# Patient Record
Sex: MALE | Race: WHITE | NOT HISPANIC OR LATINO | Employment: OTHER | URBAN - METROPOLITAN AREA
[De-identification: names, ages, dates, MRNs, and addresses within clinical notes are randomized per-mention and may not be internally consistent; named-entity substitution may affect disease eponyms.]

---

## 2017-07-19 ENCOUNTER — APPOINTMENT (OUTPATIENT)
Dept: GENERAL RADIOLOGY | Facility: HOSPITAL | Age: 72
End: 2017-07-19

## 2017-07-19 ENCOUNTER — HOSPITAL ENCOUNTER (INPATIENT)
Facility: HOSPITAL | Age: 72
LOS: 3 days | Discharge: HOME | End: 2017-07-22
Attending: EMERGENCY MEDICINE | Admitting: HOSPITALIST

## 2017-07-19 DIAGNOSIS — A41.9 SEPSIS, DUE TO UNSPECIFIED ORGANISM: Primary | ICD-10-CM

## 2017-07-19 DIAGNOSIS — J18.9 PNEUMONIA, COMMUNITY ACQUIRED: ICD-10-CM

## 2017-07-19 PROBLEM — E87.6 HYPOKALEMIA: Status: ACTIVE | Noted: 2017-07-19

## 2017-07-19 PROBLEM — E87.1 HYPONATREMIA: Status: ACTIVE | Noted: 2017-07-19

## 2017-07-19 PROBLEM — D72.829 LEUKOCYTOSIS: Status: ACTIVE | Noted: 2017-07-19

## 2017-07-19 LAB
ALBUMIN SERPL-MCNC: 3.9 G/DL (ref 3.2–4.8)
ALBUMIN/GLOB SERPL: 1.3 G/DL (ref 1.5–2.5)
ALP SERPL-CCNC: 54 U/L (ref 25–100)
ALT SERPL W P-5'-P-CCNC: 28 U/L (ref 7–40)
ANION GAP SERPL CALCULATED.3IONS-SCNC: 6 MMOL/L (ref 3–11)
ANION GAP SERPL CALCULATED.3IONS-SCNC: 8 MMOL/L (ref 3–11)
AST SERPL-CCNC: 47 U/L (ref 0–33)
BASOPHILS # BLD AUTO: 0.01 10*3/MM3 (ref 0–0.2)
BASOPHILS NFR BLD AUTO: 0.1 % (ref 0–1)
BILIRUB SERPL-MCNC: 0.4 MG/DL (ref 0.3–1.2)
BUN BLD-MCNC: 13 MG/DL (ref 9–23)
BUN BLD-MCNC: 15 MG/DL (ref 9–23)
BUN/CREAT SERPL: 16.3 (ref 7–25)
BUN/CREAT SERPL: 16.7 (ref 7–25)
CALCIUM SPEC-SCNC: 8.9 MG/DL (ref 8.7–10.4)
CALCIUM SPEC-SCNC: 9.4 MG/DL (ref 8.7–10.4)
CHLORIDE SERPL-SCNC: 83 MMOL/L (ref 99–109)
CHLORIDE SERPL-SCNC: 85 MMOL/L (ref 99–109)
CO2 SERPL-SCNC: 31 MMOL/L (ref 20–31)
CO2 SERPL-SCNC: 32 MMOL/L (ref 20–31)
CREAT BLD-MCNC: 0.8 MG/DL (ref 0.6–1.3)
CREAT BLD-MCNC: 0.9 MG/DL (ref 0.6–1.3)
D-LACTATE SERPL-SCNC: 1.1 MMOL/L (ref 0.5–2)
DEPRECATED RDW RBC AUTO: 44.4 FL (ref 37–54)
EOSINOPHIL # BLD AUTO: 0 10*3/MM3 (ref 0–0.3)
EOSINOPHIL NFR BLD AUTO: 0 % (ref 0–3)
ERYTHROCYTE [DISTWIDTH] IN BLOOD BY AUTOMATED COUNT: 13.6 % (ref 11.3–14.5)
GFR SERPL CREATININE-BSD FRML MDRD: 84 ML/MIN/1.73
GFR SERPL CREATININE-BSD FRML MDRD: 96 ML/MIN/1.73
GLOBULIN UR ELPH-MCNC: 2.9 GM/DL
GLUCOSE BLD-MCNC: 108 MG/DL (ref 70–100)
GLUCOSE BLD-MCNC: 119 MG/DL (ref 70–100)
GLUCOSE BLDC GLUCOMTR-MCNC: 117 MG/DL (ref 70–130)
GLUCOSE BLDC GLUCOMTR-MCNC: 117 MG/DL (ref 70–130)
HCT VFR BLD AUTO: 37 % (ref 38.9–50.9)
HGB BLD-MCNC: 12.6 G/DL (ref 13.1–17.5)
HOLD SPECIMEN: NORMAL
HOLD SPECIMEN: NORMAL
IMM GRANULOCYTES # BLD: 0.04 10*3/MM3 (ref 0–0.03)
IMM GRANULOCYTES NFR BLD: 0.3 % (ref 0–0.6)
LYMPHOCYTES # BLD AUTO: 0.84 10*3/MM3 (ref 0.6–4.8)
LYMPHOCYTES NFR BLD AUTO: 6.7 % (ref 24–44)
MAGNESIUM SERPL-MCNC: 1.9 MG/DL (ref 1.3–2.7)
MCH RBC QN AUTO: 29.9 PG (ref 27–31)
MCHC RBC AUTO-ENTMCNC: 34.1 G/DL (ref 32–36)
MCV RBC AUTO: 87.9 FL (ref 80–99)
MONOCYTES # BLD AUTO: 1.69 10*3/MM3 (ref 0–1)
MONOCYTES NFR BLD AUTO: 13.5 % (ref 0–12)
NEUTROPHILS # BLD AUTO: 9.91 10*3/MM3 (ref 1.5–8.3)
NEUTROPHILS NFR BLD AUTO: 79.4 % (ref 41–71)
PLATELET # BLD AUTO: 291 10*3/MM3 (ref 150–450)
PMV BLD AUTO: 9.6 FL (ref 6–12)
POTASSIUM BLD-SCNC: 2.9 MMOL/L (ref 3.5–5.5)
POTASSIUM BLD-SCNC: 3 MMOL/L (ref 3.5–5.5)
PROT SERPL-MCNC: 6.8 G/DL (ref 5.7–8.2)
RBC # BLD AUTO: 4.21 10*6/MM3 (ref 4.2–5.76)
SODIUM BLD-SCNC: 122 MMOL/L (ref 132–146)
SODIUM BLD-SCNC: 123 MMOL/L (ref 132–146)
TROPONIN I SERPL-MCNC: 0.02 NG/ML (ref 0–0.07)
WBC NRBC COR # BLD: 12.49 10*3/MM3 (ref 3.5–10.8)
WHOLE BLOOD HOLD SPECIMEN: NORMAL
WHOLE BLOOD HOLD SPECIMEN: NORMAL

## 2017-07-19 PROCEDURE — 83735 ASSAY OF MAGNESIUM: CPT | Performed by: EMERGENCY MEDICINE

## 2017-07-19 PROCEDURE — 25010000002 AZITHROMYCIN: Performed by: NURSE PRACTITIONER

## 2017-07-19 PROCEDURE — 99284 EMERGENCY DEPT VISIT MOD MDM: CPT

## 2017-07-19 PROCEDURE — 71020 HC CHEST PA AND LATERAL: CPT

## 2017-07-19 PROCEDURE — 25010000002 PIPERACILLIN-TAZOBACTAM: Performed by: EMERGENCY MEDICINE

## 2017-07-19 PROCEDURE — 85025 COMPLETE CBC W/AUTO DIFF WBC: CPT | Performed by: EMERGENCY MEDICINE

## 2017-07-19 PROCEDURE — 87040 BLOOD CULTURE FOR BACTERIA: CPT | Performed by: EMERGENCY MEDICINE

## 2017-07-19 PROCEDURE — 84484 ASSAY OF TROPONIN QUANT: CPT

## 2017-07-19 PROCEDURE — 25010000002 CEFTRIAXONE PER 250 MG: Performed by: NURSE PRACTITIONER

## 2017-07-19 PROCEDURE — 83605 ASSAY OF LACTIC ACID: CPT | Performed by: EMERGENCY MEDICINE

## 2017-07-19 PROCEDURE — 25010000002 ENOXAPARIN PER 10 MG: Performed by: NURSE PRACTITIONER

## 2017-07-19 PROCEDURE — 99223 1ST HOSP IP/OBS HIGH 75: CPT | Performed by: HOSPITALIST

## 2017-07-19 PROCEDURE — 80053 COMPREHEN METABOLIC PANEL: CPT | Performed by: EMERGENCY MEDICINE

## 2017-07-19 PROCEDURE — 25010000002 VANCOMYCIN: Performed by: EMERGENCY MEDICINE

## 2017-07-19 PROCEDURE — 93005 ELECTROCARDIOGRAM TRACING: CPT | Performed by: EMERGENCY MEDICINE

## 2017-07-19 PROCEDURE — 82962 GLUCOSE BLOOD TEST: CPT

## 2017-07-19 RX ORDER — SODIUM CHLORIDE 0.9 % (FLUSH) 0.9 %
10 SYRINGE (ML) INJECTION AS NEEDED
Status: DISCONTINUED | OUTPATIENT
Start: 2017-07-19 | End: 2017-07-22 | Stop reason: HOSPADM

## 2017-07-19 RX ORDER — SODIUM CHLORIDE 0.9 % (FLUSH) 0.9 %
1-10 SYRINGE (ML) INJECTION AS NEEDED
Status: DISCONTINUED | OUTPATIENT
Start: 2017-07-19 | End: 2017-07-22 | Stop reason: HOSPADM

## 2017-07-19 RX ORDER — POTASSIUM CHLORIDE 1.5 G/1.77G
40 POWDER, FOR SOLUTION ORAL AS NEEDED
Status: DISCONTINUED | OUTPATIENT
Start: 2017-07-19 | End: 2017-07-22 | Stop reason: HOSPADM

## 2017-07-19 RX ORDER — SODIUM CHLORIDE 0.9 % (FLUSH) 0.9 %
10 SYRINGE (ML) INJECTION AS NEEDED
Status: DISCONTINUED | OUTPATIENT
Start: 2017-07-19 | End: 2017-07-19

## 2017-07-19 RX ORDER — POTASSIUM CHLORIDE 7.45 MG/ML
10 INJECTION INTRAVENOUS
Status: DISCONTINUED | OUTPATIENT
Start: 2017-07-19 | End: 2017-07-22 | Stop reason: HOSPADM

## 2017-07-19 RX ORDER — CALCIUM CARBONATE 200(500)MG
2 TABLET,CHEWABLE ORAL 2 TIMES DAILY PRN
Status: DISCONTINUED | OUTPATIENT
Start: 2017-07-19 | End: 2017-07-22 | Stop reason: HOSPADM

## 2017-07-19 RX ORDER — ALUMINA, MAGNESIA, AND SIMETHICONE 2400; 2400; 240 MG/30ML; MG/30ML; MG/30ML
15 SUSPENSION ORAL EVERY 6 HOURS PRN
Status: DISCONTINUED | OUTPATIENT
Start: 2017-07-19 | End: 2017-07-22 | Stop reason: HOSPADM

## 2017-07-19 RX ORDER — CEFTRIAXONE SODIUM 1 G/50ML
1 INJECTION, SOLUTION INTRAVENOUS
Status: DISCONTINUED | OUTPATIENT
Start: 2017-07-19 | End: 2017-07-22 | Stop reason: HOSPADM

## 2017-07-19 RX ORDER — POTASSIUM CHLORIDE 750 MG/1
40 CAPSULE, EXTENDED RELEASE ORAL AS NEEDED
Status: DISCONTINUED | OUTPATIENT
Start: 2017-07-19 | End: 2017-07-22 | Stop reason: HOSPADM

## 2017-07-19 RX ORDER — ONDANSETRON 2 MG/ML
4 INJECTION INTRAMUSCULAR; INTRAVENOUS EVERY 6 HOURS PRN
Status: DISCONTINUED | OUTPATIENT
Start: 2017-07-19 | End: 2017-07-22 | Stop reason: HOSPADM

## 2017-07-19 RX ORDER — ONDANSETRON 4 MG/1
4 TABLET, FILM COATED ORAL EVERY 6 HOURS PRN
Status: DISCONTINUED | OUTPATIENT
Start: 2017-07-19 | End: 2017-07-22 | Stop reason: HOSPADM

## 2017-07-19 RX ORDER — SENNA AND DOCUSATE SODIUM 50; 8.6 MG/1; MG/1
2 TABLET, FILM COATED ORAL 2 TIMES DAILY
Status: DISCONTINUED | OUTPATIENT
Start: 2017-07-19 | End: 2017-07-22 | Stop reason: HOSPADM

## 2017-07-19 RX ORDER — SODIUM CHLORIDE 9 MG/ML
75 INJECTION, SOLUTION INTRAVENOUS CONTINUOUS
Status: DISCONTINUED | OUTPATIENT
Start: 2017-07-19 | End: 2017-07-22 | Stop reason: HOSPADM

## 2017-07-19 RX ORDER — ACETAMINOPHEN 500 MG
1000 TABLET ORAL ONCE
Status: COMPLETED | OUTPATIENT
Start: 2017-07-19 | End: 2017-07-19

## 2017-07-19 RX ORDER — ACETAMINOPHEN 325 MG/1
650 TABLET ORAL EVERY 4 HOURS PRN
Status: DISCONTINUED | OUTPATIENT
Start: 2017-07-19 | End: 2017-07-22 | Stop reason: HOSPADM

## 2017-07-19 RX ADMIN — POTASSIUM CHLORIDE 40 MEQ: 750 CAPSULE, EXTENDED RELEASE ORAL at 19:52

## 2017-07-19 RX ADMIN — CALCIUM CARBONATE 2 TABLET: 500 TABLET ORAL at 21:48

## 2017-07-19 RX ADMIN — ENOXAPARIN SODIUM 40 MG: 40 INJECTION SUBCUTANEOUS at 19:52

## 2017-07-19 RX ADMIN — VANCOMYCIN HYDROCHLORIDE 1500 MG: 1 INJECTION, POWDER, LYOPHILIZED, FOR SOLUTION INTRAVENOUS at 14:10

## 2017-07-19 RX ADMIN — AZITHROMYCIN 500 MG: 500 INJECTION, POWDER, LYOPHILIZED, FOR SOLUTION INTRAVENOUS at 19:56

## 2017-07-19 RX ADMIN — SODIUM CHLORIDE 1000 ML: 9 INJECTION, SOLUTION INTRAVENOUS at 14:10

## 2017-07-19 RX ADMIN — TAZOBACTAM SODIUM AND PIPERACILLIN SODIUM 4.5 G: .5; 4 INJECTION, POWDER, LYOPHILIZED, FOR SOLUTION INTRAVENOUS at 15:46

## 2017-07-19 RX ADMIN — SODIUM CHLORIDE 75 ML/HR: 9 INJECTION, SOLUTION INTRAVENOUS at 22:05

## 2017-07-19 RX ADMIN — ACETAMINOPHEN 1000 MG: 500 TABLET ORAL at 14:10

## 2017-07-19 RX ADMIN — POTASSIUM CHLORIDE 40 MEQ: 750 CAPSULE, EXTENDED RELEASE ORAL at 23:30

## 2017-07-19 RX ADMIN — CEFTRIAXONE SODIUM 1 G: 1 INJECTION, SOLUTION INTRAVENOUS at 19:52

## 2017-07-19 NOTE — PLAN OF CARE
Problem: Patient Care Overview (Adult)  Goal: Adult Individualization and Mutuality  Outcome: Ongoing (interventions implemented as appropriate)    07/19/17 1917   Individualization   Patient Specific Goals Pain<4   Patient Specific Interventions Assess pain q2h & prn         Problem: Pneumonia (Adult)  Goal: Signs and Symptoms of Listed Potential Problems Will be Absent or Manageable (Pneumonia)  Outcome: Ongoing (interventions implemented as appropriate)    07/19/17 1917   Pneumonia   Problems Assessed (Pneumonia) progression of infection   Problems Present (Pneumonia) progression of infection

## 2017-07-19 NOTE — ED PROVIDER NOTES
Subjective   HPI Comments: Glen Gonzalez is a 71 y.o. male who presents to the ED with AMS. For the past 2 weeks he has had a productive cough, of clear, white, and green sputum, but for the past week has felt disoriented and lethargic. His wife first noticed 2 nights ago that he seemed disoriented and reports that he woke up several times at night, acting abnormally. He denies any fever, CP, burning or itching of his eyes, swelling in his legs, abdominal pain, nausea, vomiting, or urinary symptoms, though he has had decreased appetite, chills, and diarrhea the past couple nights. Prior to 2 days ago, his wife reports that he had been acting normally to her. He was seen at his PCP's office, who recommended presentation to the ED. He has no known medical history and reports nothing else acute at this time.     Patient is a 71 y.o. male presenting with altered mental status.   History provided by:  Patient and spouse  Altered Mental Status   Presenting symptoms: disorientation and lethargy    Severity:  Moderate  Most recent episode:  Today  Episode history:  Multiple  Timing:  Constant  Progression:  Worsening  Chronicity:  New  Associated symptoms: decreased appetite    Associated symptoms: no abdominal pain, no fever, no nausea and no vomiting        Review of Systems   Constitutional: Positive for appetite change, chills and decreased appetite. Negative for fever.   Eyes: Negative for pain and itching.   Respiratory: Positive for cough (Productive). Negative for shortness of breath.    Cardiovascular: Negative for chest pain and leg swelling.   Gastrointestinal: Positive for diarrhea. Negative for abdominal pain, nausea and vomiting.   Genitourinary: Negative for frequency and urgency.   All other systems reviewed and are negative.      History reviewed. No pertinent past medical history.    No Known Allergies    History reviewed. No pertinent surgical history.    History reviewed. No pertinent family  history.    Social History     Social History   • Marital status:      Spouse name: N/A   • Number of children: N/A   • Years of education: N/A     Social History Main Topics   • Smoking status: Never Smoker   • Smokeless tobacco: None   • Alcohol use 4.8 oz/week     8 Cans of beer per week   • Drug use: No   • Sexual activity: Not Asked     Other Topics Concern   • None     Social History Narrative   • None         Objective   Physical Exam   Constitutional: He is oriented to person, place, and time. He appears well-developed and well-nourished. No distress.   HENT:   Head: Normocephalic and atraumatic.   Nose: Nose normal.   Mouth/Throat: Oropharynx is clear and moist and mucous membranes are normal.   Eyes: Conjunctivae are normal. No scleral icterus.   Neck: Normal range of motion. Neck supple.   Cardiovascular: Normal rate, regular rhythm, normal heart sounds and intact distal pulses.    No murmur heard.  Pulmonary/Chest: Effort normal and breath sounds normal. No respiratory distress.   Abdominal: Soft. Bowel sounds are normal. There is no tenderness.   Musculoskeletal: Normal range of motion.   Neurological: He is alert and oriented to person, place, and time.   Skin: Skin is warm and dry. He is not diaphoretic.   Psychiatric: He has a normal mood and affect. His behavior is normal.   Nursing note and vitals reviewed.      Critical Care  Performed by: DAT LOPEZ  Authorized by: DAT LOPEZ   Total critical care time: 40 minutes  Critical care time was exclusive of teaching time and separately billable procedures and treating other patients.  Critical care was necessary to treat or prevent imminent or life-threatening deterioration of the following conditions: respiratory failure and sepsis.  Critical care was time spent personally by me on the following activities: development of treatment plan with patient or surrogate, evaluation of patient's response to treatment, examination of patient,  obtaining history from patient or surrogate, ordering and performing treatments and interventions, ordering and review of laboratory studies, ordering and review of radiographic studies, re-evaluation of patient's condition and pulse oximetry.  Comments: Patient with altered mental status due to sepsis, rapid initiation of antibiotics, fluid bolus, recheck, treatment of fever.               ED Course  ED Course   Comment By Time   Staying alert and apparently oriented.  No objection to admission.  Short stay anticipated. Pop Robins MD 07/19 8323                 Recent Results (from the past 24 hour(s))   Comprehensive Metabolic Panel    Collection Time: 07/19/17  1:31 PM   Result Value Ref Range    Glucose 108 (H) 70 - 100 mg/dL    BUN 15 9 - 23 mg/dL    Creatinine 0.90 0.60 - 1.30 mg/dL    Sodium 123 (L) 132 - 146 mmol/L    Potassium 2.9 (L) 3.5 - 5.5 mmol/L    Chloride 83 (C) 99 - 109 mmol/L    CO2 32.0 (H) 20.0 - 31.0 mmol/L    Calcium 9.4 8.7 - 10.4 mg/dL    Total Protein 6.8 5.7 - 8.2 g/dL    Albumin 3.90 3.20 - 4.80 g/dL    ALT (SGPT) 28 7 - 40 U/L    AST (SGOT) 47 (H) 0 - 33 U/L    Alkaline Phosphatase 54 25 - 100 U/L    Total Bilirubin 0.4 0.3 - 1.2 mg/dL    eGFR Non African Amer 84 >60 mL/min/1.73    Globulin 2.9 gm/dL    A/G Ratio 1.3 (L) 1.5 - 2.5 g/dL    BUN/Creatinine Ratio 16.7 7.0 - 25.0    Anion Gap 8.0 3.0 - 11.0 mmol/L   Magnesium    Collection Time: 07/19/17  1:31 PM   Result Value Ref Range    Magnesium 1.9 1.3 - 2.7 mg/dL   Light Blue Top    Collection Time: 07/19/17  1:31 PM   Result Value Ref Range    Extra Tube hold for add-on    Green Top (Gel)    Collection Time: 07/19/17  1:31 PM   Result Value Ref Range    Extra Tube Hold for add-ons.    Lavender Top    Collection Time: 07/19/17  1:31 PM   Result Value Ref Range    Extra Tube hold for add-on    Gold Top - SST    Collection Time: 07/19/17  1:31 PM   Result Value Ref Range    Extra Tube Hold for add-ons.    CBC Auto Differential     Collection Time: 07/19/17  1:31 PM   Result Value Ref Range    WBC 12.49 (H) 3.50 - 10.80 10*3/mm3    RBC 4.21 4.20 - 5.76 10*6/mm3    Hemoglobin 12.6 (L) 13.1 - 17.5 g/dL    Hematocrit 37.0 (L) 38.9 - 50.9 %    MCV 87.9 80.0 - 99.0 fL    MCH 29.9 27.0 - 31.0 pg    MCHC 34.1 32.0 - 36.0 g/dL    RDW 13.6 11.3 - 14.5 %    RDW-SD 44.4 37.0 - 54.0 fl    MPV 9.6 6.0 - 12.0 fL    Platelets 291 150 - 450 10*3/mm3    Neutrophil % 79.4 (H) 41.0 - 71.0 %    Lymphocyte % 6.7 (L) 24.0 - 44.0 %    Monocyte % 13.5 (H) 0.0 - 12.0 %    Eosinophil % 0.0 0.0 - 3.0 %    Basophil % 0.1 0.0 - 1.0 %    Immature Grans % 0.3 0.0 - 0.6 %    Neutrophils, Absolute 9.91 (H) 1.50 - 8.30 10*3/mm3    Lymphocytes, Absolute 0.84 0.60 - 4.80 10*3/mm3    Monocytes, Absolute 1.69 (H) 0.00 - 1.00 10*3/mm3    Eosinophils, Absolute 0.00 0.00 - 0.30 10*3/mm3    Basophils, Absolute 0.01 0.00 - 0.20 10*3/mm3    Immature Grans, Absolute 0.04 (H) 0.00 - 0.03 10*3/mm3   Lactic Acid, Plasma    Collection Time: 07/19/17  1:34 PM   Result Value Ref Range    Lactate 1.1 0.5 - 2.0 mmol/L   POC Troponin, Rapid    Collection Time: 07/19/17  1:42 PM   Result Value Ref Range    Troponin I 0.02 0.00 - 0.07 ng/mL   POC Glucose Fingerstick    Collection Time: 07/19/17  1:44 PM   Result Value Ref Range    Glucose 117 70 - 130 mg/dL   POC Glucose Fingerstick    Collection Time: 07/19/17  1:59 PM   Result Value Ref Range    Glucose 117 70 - 130 mg/dL     Note: In addition to lab results from this visit, the labs listed above may include labs taken at another facility or during a different encounter within the last 24 hours. Please correlate lab times with ED admission and discharge times for further clarification of the services performed during this visit.    XR Chest 2 View   Preliminary Result   1. Left lower lobe pneumonia.   2. Mild diffuse interstitial changes elsewhere which could be chronic or   could reflect an underlying bronchitis.    3. Round dense left upper  "lobe nodule, probably partially calcified and   favored to represent a granuloma. If there are no previous outside   studies available for comparison, consider chest CT scan evaluation to   confirm calcification.       D:  07/19/2017   E:  07/19/2017            Vitals:    07/19/17 1255 07/19/17 1413 07/19/17 1430 07/19/17 1500   BP: 149/72 143/73 136/67 120/66   BP Location: Left arm      Patient Position: Sitting      Pulse: 110      Resp: 20      Temp: 99.8 °F (37.7 °C)      TempSrc: Oral      SpO2: 93%  90%    Weight: 152 lb (68.9 kg)      Height: 69\" (175.3 cm)        Medications   piperacillin-tazobactam (ZOSYN) 4.5 g/100 mL 0.9% NS IVPB (mbp) (not administered)   sodium chloride 0.9 % flush 10 mL (not administered)   sodium chloride 0.9 % bolus 1,000 mL (1,000 mL Intravenous New Bag 7/19/17 1410)   vancomycin 1500 mg/500 mL 0.9% NS IVPB ([facility]) (1,500 mg Intravenous New Bag 7/19/17 1410)   acetaminophen (TYLENOL) tablet 1,000 mg (1,000 mg Oral Given 7/19/17 1410)     ECG/EMG Results (last 24 hours)     Procedure Component Value Units Date/Time    ECG 12 Lead [[id number]] Collected:  07/19/17 1317     Updated:  07/19/17 1334    Narrative:       Test Reason : Weak/Dizzy/AMS protocol  Blood Pressure : **/** mmHG  Vent. Rate : 105 BPM     Atrial Rate : 105 BPM     P-R Int : 148 ms          QRS Dur : 136 ms      QT Int : 382 ms       P-R-T Axes : 070 -02 096 degrees     QTc Int : 504 ms    Sinus tachycardia  Biatrial enlargement  Left bundle branch block  Abnormal ECG  No previous ECGs available  Confirmed by DAT LOPEZ MD ([id number]) on 7/19/2017 1:34:18 PM    Referred By:  VIKASH REYES           Confirmed By:DAT LOPEZ MD            [facility]    Final diagnoses:   Sepsis, due to unspecified organism   Pneumonia, community acquired       Documentation assistance provided by casey Gunter.  Information recorded by the scribe was done at my direction and has been verified and validated by me.     Lorie Gunter  07/19/17 " 1410       Pop Robins MD  07/19/17 7906

## 2017-07-19 NOTE — H&P
[facility] Medicine Services  HISTORY AND PHYSICAL    Primary Care Physician: No Known Provider    Subjective     Chief Complaint:  Cough/ disoriented     History of Present Illness:     71 y.o male with no PMH presented to the ED today for cough and disorientation. Wife brought patient ot his PCP today for evaluation and was sent to ED for further workup. Started having productive cough 2 weeks ago with white sputum that has become green in color. Noticed a sore throat and PND for the last week as well. Patient noticed that he was becoming disoriented over the last few days when he was unable to find his way around the house. Associated symptoms include headache, diarrhea and decreased appetite. Denies sick contacts or recent illnesses.   CXR in ED showed LLL CAP. Patient meets sepsis criteria with fever, leukocytosis and transient hypotension. Given fluid bolus and started on IV Zosyn and Vancomycin. Asked to be admitted to hospital medicine for further evaluation.      Review of Systems   Constitutional: Positive for appetite change, chills and fever.   HENT: Negative for congestion, trouble swallowing and voice change.    Eyes: Negative for photophobia and visual disturbance.   Respiratory: Positive for cough. Negative for shortness of breath and wheezing.    Cardiovascular: Negative for chest pain, palpitations and leg swelling.   Gastrointestinal: Positive for diarrhea. Negative for abdominal distention, abdominal pain, constipation, nausea and vomiting.   Endocrine: Negative for cold intolerance and heat intolerance.   Genitourinary: Negative for difficulty urinating, dysuria and flank pain.   Musculoskeletal: Negative for arthralgias, back pain and gait problem.   Skin: Negative for color change, pallor, rash and wound.   Neurological: Positive for headaches. Negative for dizziness, syncope, weakness, light-headedness and numbness.   Hematological: Negative for adenopathy. Does not  "bruise/bleed easily.   Psychiatric/Behavioral: Positive for confusion. Negative for agitation, behavioral problems and sleep disturbance. The patient is not nervous/anxious.       Otherwise complete ROS performed and negative except as mentioned in the HPI.    Past Medical History:   Diagnosis Date   • CAP (community acquired pneumonia) of LLL 7/19/2017       History reviewed. No pertinent surgical history.    Family History   Problem Relation Age of Onset   • Heart disease Mother    • Stroke Mother    • Cancer Father        Social History     Social History   • Marital status:      Spouse name: N/A   • Number of children: N/A   • Years of education: N/A     Occupational History   • Not on file.     Social History Main Topics   • Smoking status: Never Smoker   • Smokeless tobacco: Not on file   • Alcohol use 1.8 oz/week     3 Cans of beer per week   • Drug use: No   • Sexual activity: Defer     Other Topics Concern   • Not on file     Social History Narrative    Retired and lives with wife.        Medications:  No prescriptions prior to admission.       Allergies:  No Known Allergies      Objective     Physical Exam:  Vital Signs: /70 (BP Location: Right arm, Patient Position: Lying)  Pulse 81  Temp 98.6 °F (37 °C) (Oral)   Resp 18  Ht 69\" (175.3 cm)  Wt 152 lb (68.9 kg)  SpO2 94%  BMI 22.45 kg/m2  Physical Exam   Constitutional: He is oriented to person, place, and time. He appears well-developed and well-nourished. No distress.   HENT:   Head: Normocephalic and atraumatic.   Mouth/Throat: Oropharynx is clear and moist.   Eyes: Conjunctivae are normal. Pupils are equal, round, and reactive to light. No scleral icterus.   Neck: Neck supple. No thyromegaly present.   Cardiovascular: Normal rate, regular rhythm and intact distal pulses.  Exam reveals no gallop and no friction rub.    Murmur heard.  Pulmonary/Chest: Effort normal. No respiratory distress. He has no wheezes. He has rales (LLL fine " crackles ). He exhibits no tenderness.   Abdominal: Soft. Bowel sounds are normal. He exhibits no distension and no mass. There is no tenderness. There is no rebound and no guarding. No hernia.   Musculoskeletal: He exhibits no edema or tenderness.   Lymphadenopathy:     He has no cervical adenopathy.   Neurological: He is alert and oriented to person, place, and time. No cranial nerve deficit.   Skin: Skin is warm and dry. No rash noted. He is not diaphoretic. No erythema. No pallor.   Psychiatric: He has a normal mood and affect. His behavior is normal.   Vitals reviewed.      Results Reviewed:    Results from last 7 days  Lab Units 07/19/17  1331   WBC 10*3/mm3 12.49*   HEMOGLOBIN g/dL 12.6*   PLATELETS 10*3/mm3 291       Results from last 7 days  Lab Units 07/19/17  1331   SODIUM mmol/L 123*   POTASSIUM mmol/L 2.9*   CO2 mmol/L 32.0*   CREATININE mg/dL 0.90   GLUCOSE mg/dL 108*   CALCIUM mg/dL 9.4   Results for EWA PAYNE JR. (MRN [id number]) as of 7/19/2017 16:52   Ref. Range 7/19/2017 13:42   Troponin I Latest Ref Range: 0.00 - 0.07 ng/mL 0.02   Results for EWA PAYNE JR. (MRN [id number]) as of 7/19/2017 16:52   Ref. Range 7/19/2017 13:34   Lactate, Venous Latest Ref Range: 0.5 - 2.0 mmol/L 1.1     Results for EWA PAYNE JR. (MRN [id number]) as of 7/19/2017 16:52   Ref. Range 7/19/2017 13:31   Magnesium Latest Ref Range: 1.3 - 2.7 mg/dL 1.9     Study Result      EXAMINATION: XR CHEST 2 VW-       INDICATION: Fever, cough.       COMPARISON: None.      FINDINGS: The heart, mediastinum and pulmonary vasculature appear within  normal limits. There is dense opacity over a roughly 12 x 6 cm area of  the posterior medial left lower lobe which appears to represent airspace  disease and presumably a pneumonia. There is coarse interstitial disease  elsewhere, possibly chronic. No effusion is identified. There are a  couple of granulomatous calcifications and also a dense round 2 cm  nodule of the  posteromedial left upper lobe, a little denser than  adjacent rib and vertebral bodies presumably calcified. No other  pulmonary nodules are identified.          IMPRESSION:  1. Left lower lobe pneumonia.  2. Mild diffuse interstitial changes elsewhere which could be chronic or  could reflect an underlying bronchitis.   3. Round dense left upper lobe nodule, probably partially calcified and  favored to represent a granuloma. If there are no previous outside  studies available for comparison, consider chest CT scan evaluation to  confirm calcification.       I have personally reviewed and interpreted available lab data, radiology studies and ECG obtained at time of admission.     Assessment / Plan     Problem List:   Hospital Problem List     * (Principal)CAP (community acquired pneumonia) of LLL    Sepsis    Leukocytosis    Hypokalemia    Hyponatremia          Assessment:    Plan:  Cont antibiotics, but transition to CAP coverage with rocephin and azithromycin  Regular diet   CBC/ BMP in am   Replace electrolytes   PRN pain/ antipyretics/ antiemetics    DVT prophylaxis: Lovenox   Code Status: FULL  Admission Status: Patient will be admitted to (LEXOBS or LEXINPT)     Charmaine Mayberry, APRN 07/19/17 6:33 PM      Brief Attending Note       I have seen and examined the patient, performing an independent face-to-face diagnostic evaluation with plan of care reviewed and developed with the advanced practice clinician (APC).      Brief Summary Statement/HPI:   70 yo M presents from PCP's office due to cough, weakness, and mild confusion. He has had a cough for at least two weeks now, initially productive of thin and white sputum but has progressively gotten thicker and green in color. Last few days he has become disoriented. In the ER his temp was noted to be 102.5 although this is not documented in the chart (Dr. Robins was present during this temperature finding). He was also tachycardic to 110. Labs showed elevated WBC  count at 12.49, Na 123, and K 2.9. CXR showed a left lower lobe pneumonia. Admitted for further management.      Attending Physical Exam:  Temp:  [98.3 °F (36.8 °C)-99.8 °F (37.7 °C)] 98.3 °F (36.8 °C)  Heart Rate:  [] 85  Resp:  [18-20] 18  BP: ()/(66-75) 120/71  Constitutional: no acute distress, awake, alert  Eyes: PERRLA, sclerae anicteric, no conjunctival injection  Neck: supple, no thyromegaly, trachea midline  Respiratory: diminished LLL with some fine rales but no wheezes appreciated, nonlabored respirations   Cardiovascular: RRR, no murmurs, rubs, or gallops, palpable pedal pulses bilaterally  Gastrointestinal: Positive bowel sounds, soft, nontender, nondistended  Musculoskeletal: No bilateral ankle edema, no clubbing or cyanosis to bilateral lower extremities  Psychiatric: oriented x 3, appropriate affect, cooperative  Neurologic: Strength symmetric in all extremities, Cranial Nerves grossly intact to confrontation       Brief Assessment/Plan:  72 yo M presents with 2 week history of worsening productive cough, weakness, and mild confusion. Found to have sepsis due to a left lower lobe pneumonia.    PLAN:  --Continue CAP coverage with Rocephin and Azithromycin. Follow blood cultures. Check Strep pneumo and Legionella urine antigens.  --Start IVF's due to hyponatremia, repeat BMP tonight and in AM to monitor sodium closely.  --Replace K.  --He is doing fairly well and on room air, so if this trend continues I expect he can be transitioned to oral ATBx within 48 hours and go home.     See above for further detailed assessment and plan developed with [facility] which I have reviewed and/or edited.    I believe this patient meets INPATIENT status due to the need for care which can only be reasonably provided in an hospital setting such as aggressive/expedited ancillary services and/or consultation services and the necessity for IV medications, close physician monitoring and/or the possible need for  procedures.  In such, I feel patient’s risk for adverse outcomes and need for care warrant INPATIENT evaluation and predict the patient’s care encounter to likely last beyond 2 midnights.        Parisa Haque MD  07/19/17  8:29 PM

## 2017-07-20 LAB
ANION GAP SERPL CALCULATED.3IONS-SCNC: 10 MMOL/L (ref 3–11)
BASOPHILS # BLD AUTO: 0.01 10*3/MM3 (ref 0–0.2)
BASOPHILS NFR BLD AUTO: 0.1 % (ref 0–1)
BUN BLD-MCNC: 10 MG/DL (ref 9–23)
BUN/CREAT SERPL: 16.7 (ref 7–25)
CALCIUM SPEC-SCNC: 8.9 MG/DL (ref 8.7–10.4)
CHLORIDE SERPL-SCNC: 90 MMOL/L (ref 99–109)
CO2 SERPL-SCNC: 25 MMOL/L (ref 20–31)
CREAT BLD-MCNC: 0.6 MG/DL (ref 0.6–1.3)
DEPRECATED RDW RBC AUTO: 45.8 FL (ref 37–54)
EOSINOPHIL # BLD AUTO: 0.03 10*3/MM3 (ref 0–0.3)
EOSINOPHIL NFR BLD AUTO: 0.3 % (ref 0–3)
ERYTHROCYTE [DISTWIDTH] IN BLOOD BY AUTOMATED COUNT: 13.9 % (ref 11.3–14.5)
GFR SERPL CREATININE-BSD FRML MDRD: 134 ML/MIN/1.73
GLUCOSE BLD-MCNC: 100 MG/DL (ref 70–100)
HCT VFR BLD AUTO: 35.7 % (ref 38.9–50.9)
HGB BLD-MCNC: 11.9 G/DL (ref 13.1–17.5)
IMM GRANULOCYTES # BLD: 0.06 10*3/MM3 (ref 0–0.03)
IMM GRANULOCYTES NFR BLD: 0.6 % (ref 0–0.6)
LYMPHOCYTES # BLD AUTO: 1.01 10*3/MM3 (ref 0.6–4.8)
LYMPHOCYTES NFR BLD AUTO: 9.9 % (ref 24–44)
MCH RBC QN AUTO: 29.8 PG (ref 27–31)
MCHC RBC AUTO-ENTMCNC: 33.3 G/DL (ref 32–36)
MCV RBC AUTO: 89.3 FL (ref 80–99)
MONOCYTES # BLD AUTO: 1.18 10*3/MM3 (ref 0–1)
MONOCYTES NFR BLD AUTO: 11.5 % (ref 0–12)
NEUTROPHILS # BLD AUTO: 7.94 10*3/MM3 (ref 1.5–8.3)
NEUTROPHILS NFR BLD AUTO: 77.6 % (ref 41–71)
PLATELET # BLD AUTO: 298 10*3/MM3 (ref 150–450)
PMV BLD AUTO: 9.6 FL (ref 6–12)
POTASSIUM BLD-SCNC: 3.9 MMOL/L (ref 3.5–5.5)
RBC # BLD AUTO: 4 10*6/MM3 (ref 4.2–5.76)
SODIUM BLD-SCNC: 125 MMOL/L (ref 132–146)
WBC NRBC COR # BLD: 10.23 10*3/MM3 (ref 3.5–10.8)

## 2017-07-20 PROCEDURE — 87070 CULTURE OTHR SPECIMN AEROBIC: CPT | Performed by: HOSPITALIST

## 2017-07-20 PROCEDURE — 87449 NOS EACH ORGANISM AG IA: CPT | Performed by: HOSPITALIST

## 2017-07-20 PROCEDURE — 87205 SMEAR GRAM STAIN: CPT | Performed by: HOSPITALIST

## 2017-07-20 PROCEDURE — 85025 COMPLETE CBC W/AUTO DIFF WBC: CPT | Performed by: NURSE PRACTITIONER

## 2017-07-20 PROCEDURE — 99233 SBSQ HOSP IP/OBS HIGH 50: CPT | Performed by: INTERNAL MEDICINE

## 2017-07-20 PROCEDURE — 25010000002 CEFTRIAXONE PER 250 MG: Performed by: NURSE PRACTITIONER

## 2017-07-20 PROCEDURE — 80048 BASIC METABOLIC PNL TOTAL CA: CPT | Performed by: NURSE PRACTITIONER

## 2017-07-20 PROCEDURE — 25010000002 AZITHROMYCIN: Performed by: NURSE PRACTITIONER

## 2017-07-20 RX ORDER — SACCHAROMYCES BOULARDII 250 MG
250 CAPSULE ORAL 2 TIMES DAILY
Status: DISCONTINUED | OUTPATIENT
Start: 2017-07-20 | End: 2017-07-22 | Stop reason: HOSPADM

## 2017-07-20 RX ADMIN — ACETAMINOPHEN 650 MG: 325 TABLET, FILM COATED ORAL at 00:23

## 2017-07-20 RX ADMIN — ACETAMINOPHEN 650 MG: 325 TABLET, FILM COATED ORAL at 12:44

## 2017-07-20 RX ADMIN — CEFTRIAXONE SODIUM 1 G: 1 INJECTION, SOLUTION INTRAVENOUS at 17:59

## 2017-07-20 RX ADMIN — SODIUM CHLORIDE 75 ML/HR: 9 INJECTION, SOLUTION INTRAVENOUS at 16:26

## 2017-07-20 RX ADMIN — POTASSIUM CHLORIDE 40 MEQ: 750 CAPSULE, EXTENDED RELEASE ORAL at 04:00

## 2017-07-20 RX ADMIN — Medication 250 MG: at 18:06

## 2017-07-20 RX ADMIN — AZITHROMYCIN 500 MG: 500 INJECTION, POWDER, LYOPHILIZED, FOR SOLUTION INTRAVENOUS at 20:03

## 2017-07-20 RX ADMIN — Medication 250 MG: at 12:44

## 2017-07-20 NOTE — PROGRESS NOTES
Discharge Planning Assessment  [facility]     Patient Name: Glen Gonzalez Jr.  MRN: [id number]  Today's Date: 7/20/2017    Admit Date: 7/19/2017          Discharge Needs Assessment       07/20/17 1056    Living Environment    Lives With spouse    Living Arrangements house    Home Accessibility no concerns    Stair Railings at Home outside, present on left side    Type of Financial/Environmental Concern none    Transportation Available car;family or friend will provide    Living Environment    Provides Primary Care For no one    Primary Care Provided By spouse/significant other    Quality Of Family Relationships supportive;helpful;involved    Able to Return to Prior Living Arrangements yes    Discharge Needs Assessment    Concerns To Be Addressed no discharge needs identified;denies needs/concerns at this time    Readmission Within The Last 30 Days no previous admission in last 30 days    Anticipated Changes Related to Illness none    Equipment Currently Used at Home none    Equipment Needed After Discharge none    Discharge Disposition home or self-care            Discharge Plan       07/20/17 1058    Case Management/Social Work Plan    Plan Home     Patient/Family In Agreement With Plan yes    Additional Comments Spoke with patient at bedside, he lives with his wife who is supportive and able to help with any needs he may have. He plans to return home and denies any discharge needs at this time - shant martin rn [phone]        Discharge Placement     No information found                Demographic Summary       07/20/17 1055    Referral Information    Admission Type inpatient    Referral Source admission list    Reason For Consult discharge planning    Record Reviewed clinical discipline documentation;history and physical    Primary Care Physician Information    Name [facility]             Functional Status       07/20/17 1055    Functional Status Current    Current Functional Level Comment Please  see nurses notes     Functional Status Prior    Ambulation 0-->independent    Transferring 0-->independent    Toileting 0-->independent    Bathing 0-->independent    Dressing 0-->independent    Eating 0-->independent    Communication 0-->understands/communicates without difficulty    Swallowing 0-->swallows foods/liquids without difficulty    IADL    Medications independent    Meal Preparation independent    Housekeeping independent    Laundry independent    Shopping independent    Oral Care independent    Activity Tolerance    Current Activity Limitations none    Usual Activity Tolerance good    Current Activity Tolerance good    Employment/Financial    Employment/Finance Comments Patient has medicare A & B with prescription benefits             Psychosocial     None            Abuse/Neglect     None            Legal     None            Substance Abuse     None            Patient Forms     None          Niyah Agutsin RN

## 2017-07-20 NOTE — PLAN OF CARE
Problem: Pneumonia (Adult)  Goal: Signs and Symptoms of Listed Potential Problems Will be Absent or Manageable (Pneumonia)  Outcome: Ongoing (interventions implemented as appropriate)    07/20/17 1641   Pneumonia   Problems Assessed (Pneumonia) progression of infection   Problems Present (Pneumonia) none

## 2017-07-20 NOTE — PROGRESS NOTES
[facility] Medicine Services  INPATIENT PROGRESS NOTE    Date of Admission: 7/19/2017  Length of Stay: 1  Primary Care Physician: No Known Provider    Subjective   CC: confused, febrile, coughing    HPI:  Feels better.  Still coughing.   Developed diarrhea  Stronger.  No SOA with ambulating on RA.   Wife says he is less confused, more like his usual self  RN says he is still not mentating normally.    Review Of Systems:   Review of Systems      Objective      Temp:  [98.3 °F (36.8 °C)-99.8 °F (37.7 °C)] 98.4 °F (36.9 °C)  Heart Rate:  [] 105  Resp:  [18-20] 18  BP: ()/(65-75) 153/74  Physical Exam  Gen:  WD/WN man moving around room, adjusting his iv pole nad sat monitor without help.   Neuro: alert and oriented, clear speech, follows commands, grossly nonfocal  HEENT:  NC/AT PERRL, OP benign  Neck:  Supple, no LAD  Heart tachy RR no murmur, rub, or gallop  Lungs Decreased at L base, otherwise neg  Abd:  Soft, nontender, no rebound or guarding, pos BS  Extrem:  No c/c/e      Results Review:    I have reviewed the labs, radiology results and diagnostic studies.      Results from last 7 days  Lab Units 07/20/17  0623   WBC 10*3/mm3 10.23   HEMOGLOBIN g/dL 11.9*   PLATELETS 10*3/mm3 298       Results from last 7 days  Lab Units 07/20/17  0623   SODIUM mmol/L 125*   POTASSIUM mmol/L 3.9   CHLORIDE mmol/L 90*   CO2 mmol/L 25.0   BUN mg/dL 10   CREATININE mg/dL 0.60   GLUCOSE mg/dL 100   CALCIUM mg/dL 8.9       Culture Data: Cultures:    Blood Culture   Date Value Ref Range Status   07/19/2017 No growth at less than 24 hours  Preliminary   07/19/2017 No growth at less than 24 hours  Preliminary       Radiology Data:     I have reviewed the medications.    Assessment/Plan     Problem List  Hospital Problem List     * (Principal)CAP (community acquired pneumonia) of LLL    Leukocytosis    Hypokalemia    Sepsis    Hyponatremia             71 yr old man, usually healthy and on no meds,  got confused at home with 2 weeks of cough and dyspnea and diarrhea.  Temp in ER was 102.5       Plan:  CAP  -- abx day 2    Sepsis  -- transient hypotn has resolved  -- treat pna    Hyponatremia  -- a bit better  -- suspect dehydration and SIADH  -- if not corrected by tomorrow, check osms and lytes and consider whehter it's chronic and reflective of undiagnosed problem eg lung CA.    Confusion  -- likely from hypona, sepsis and fever  --if fails to resolve consider brain imaging. .    Diarrhea  -- just started  -- probiotic  -- wtach       DVT prophylaxis: Lovenox   Code Status: FULL         DVT prophylaxis:  Discharge Planning: I expect patient to be discharged to home in 1-2 days.    Aida Luther MD   07/20/17   10:26 AM

## 2017-07-21 ENCOUNTER — APPOINTMENT (OUTPATIENT)
Dept: CT IMAGING | Facility: HOSPITAL | Age: 72
End: 2017-07-21

## 2017-07-21 LAB
ANION GAP SERPL CALCULATED.3IONS-SCNC: 8 MMOL/L (ref 3–11)
BUN BLD-MCNC: 5 MG/DL (ref 9–23)
BUN/CREAT SERPL: 10 (ref 7–25)
CALCIUM SPEC-SCNC: 8.4 MG/DL (ref 8.7–10.4)
CHLORIDE SERPL-SCNC: 92 MMOL/L (ref 99–109)
CO2 SERPL-SCNC: 24 MMOL/L (ref 20–31)
CREAT BLD-MCNC: 0.5 MG/DL (ref 0.6–1.3)
DEPRECATED RDW RBC AUTO: 46.1 FL (ref 37–54)
ERYTHROCYTE [DISTWIDTH] IN BLOOD BY AUTOMATED COUNT: 14 % (ref 11.3–14.5)
GFR SERPL CREATININE-BSD FRML MDRD: >150 ML/MIN/1.73
GLUCOSE BLD-MCNC: 92 MG/DL (ref 70–100)
HCT VFR BLD AUTO: 35.7 % (ref 38.9–50.9)
HGB BLD-MCNC: 12 G/DL (ref 13.1–17.5)
MCH RBC QN AUTO: 30 PG (ref 27–31)
MCHC RBC AUTO-ENTMCNC: 33.6 G/DL (ref 32–36)
MCV RBC AUTO: 89.3 FL (ref 80–99)
OSMOLALITY UR: 208 MOSM/KG (ref 300–1100)
PLATELET # BLD AUTO: 330 10*3/MM3 (ref 150–450)
PMV BLD AUTO: 9.5 FL (ref 6–12)
POTASSIUM BLD-SCNC: 3.6 MMOL/L (ref 3.5–5.5)
RBC # BLD AUTO: 4 10*6/MM3 (ref 4.2–5.76)
SODIUM BLD-SCNC: 124 MMOL/L (ref 132–146)
SODIUM UR-SCNC: 31 MMOL/L (ref 30–90)
WBC NRBC COR # BLD: 9.9 10*3/MM3 (ref 3.5–10.8)

## 2017-07-21 PROCEDURE — 83935 ASSAY OF URINE OSMOLALITY: CPT | Performed by: INTERNAL MEDICINE

## 2017-07-21 PROCEDURE — 25010000002 CEFTRIAXONE PER 250 MG: Performed by: NURSE PRACTITIONER

## 2017-07-21 PROCEDURE — 25010000002 AZITHROMYCIN: Performed by: NURSE PRACTITIONER

## 2017-07-21 PROCEDURE — 0 IOPAMIDOL 61 % SOLUTION: Performed by: INTERNAL MEDICINE

## 2017-07-21 PROCEDURE — 80048 BASIC METABOLIC PNL TOTAL CA: CPT | Performed by: INTERNAL MEDICINE

## 2017-07-21 PROCEDURE — 99233 SBSQ HOSP IP/OBS HIGH 50: CPT | Performed by: INTERNAL MEDICINE

## 2017-07-21 PROCEDURE — 71260 CT THORAX DX C+: CPT

## 2017-07-21 PROCEDURE — 85027 COMPLETE CBC AUTOMATED: CPT | Performed by: INTERNAL MEDICINE

## 2017-07-21 PROCEDURE — 84300 ASSAY OF URINE SODIUM: CPT | Performed by: INTERNAL MEDICINE

## 2017-07-21 RX ADMIN — CEFTRIAXONE SODIUM 1 G: 1 INJECTION, SOLUTION INTRAVENOUS at 17:05

## 2017-07-21 RX ADMIN — IOPAMIDOL 75 ML: 612 INJECTION, SOLUTION INTRAVENOUS at 11:47

## 2017-07-21 RX ADMIN — AZITHROMYCIN 500 MG: 500 INJECTION, POWDER, LYOPHILIZED, FOR SOLUTION INTRAVENOUS at 20:53

## 2017-07-21 RX ADMIN — SODIUM CHLORIDE 75 ML/HR: 9 INJECTION, SOLUTION INTRAVENOUS at 08:01

## 2017-07-21 RX ADMIN — SODIUM CHLORIDE 75 ML/HR: 9 INJECTION, SOLUTION INTRAVENOUS at 20:57

## 2017-07-21 RX ADMIN — Medication 250 MG: at 17:05

## 2017-07-21 RX ADMIN — Medication 250 MG: at 07:58

## 2017-07-21 NOTE — PROGRESS NOTES
[facility] Medicine Services  INPATIENT PROGRESS NOTE    Date of Admission: 7/19/2017  Length of Stay: 2  Primary Care Physician: No Known Provider    Subjective   CC: confused, febrile, coughing    HPI:  Feels better, not confused  Wife concurs that he is better  No soa, not coughing much, no chest pain  Fever yest at 11AM  No GI distress    Review Of Systems:   Review of Systems      Objective      Temp:  [98 °F (36.7 °C)-101.3 °F (38.5 °C)] 98 °F (36.7 °C)  Heart Rate:  [] 89  Resp:  [18] 18  BP: (135-159)/(70-92) 145/79  Physical Exam  Gen:  WD/WN man in bed, joking, alert, pleasant,   Neuro: alert and oriented, clear speech, follows commands, grossly nonfocal  HEENT:  NC/AT PERRL, OP benign  Neck:  Supple, no LAD  HeartfRRR no murmur, rub, or gallop.  Tachycardia from yest has resovled  Lungs Decreased at L base, otherwise neg - stable from yest  Abd:  Soft, nontender, no rebound or guarding, pos BS  Extrem:  No c/c/e      Results Review:    I have reviewed the labs, radiology results and diagnostic studies.      Results from last 7 days  Lab Units 07/21/17  0658   WBC 10*3/mm3 9.90   HEMOGLOBIN g/dL 12.0*   PLATELETS 10*3/mm3 330       Results from last 7 days  Lab Units 07/21/17  0658   SODIUM mmol/L 124*   POTASSIUM mmol/L 3.6   CHLORIDE mmol/L 92*   CO2 mmol/L 24.0   BUN mg/dL 5*   CREATININE mg/dL 0.50*   GLUCOSE mg/dL 92   CALCIUM mg/dL 8.4*       Culture Data: Cultures:    Blood Culture   Date Value Ref Range Status   07/19/2017 No growth at less than 24 hours  Preliminary   07/19/2017 No growth at less than 24 hours  Preliminary       Radiology Data:     I have reviewed the medications.    Assessment/Plan     Problem List  Hospital Problem List     * (Principal)CAP (community acquired pneumonia) of LLL    Leukocytosis    Hypokalemia    Sepsis    Hyponatremia             71 yr old man, usually healthy and on no meds, got confused at home with 2 weeks of cough and dyspnea  and diarrhea.  Temp in ER was 102.5       Plan:  CAP  -- abx day 3  -- prominent confusion and hypoNA suggest legionella though unclear if this is new or chronic    Sepsis  -- transient hypotn has resolved  -- treat pna    Hyponatremia  -- has not corrected despite days of saline  -- suspect SIADH  -- Discussed with patient and wife:  CT now or wait and see as outpt?  They prefer CT now and given the hyponatremia and xray appearance this is reasonable.   -  Confusion  -- likely from hypona, sepsis and fever, maybe legionella  -- much improved   .    Diarrhea  --  probiotic  -- wtach    CT today, prob home tomorrow, check urine na and osms      DVT prophylaxis:  Discharge Planning: I expect patient to be discharged to home in 1  Days.  Cover for legionella at [place] (zaithro or quinolone)    Aida Luther MD   07/21/17   10:52 AM

## 2017-07-21 NOTE — PLAN OF CARE
Problem: Pneumonia (Adult)  Goal: Signs and Symptoms of Listed Potential Problems Will be Absent or Manageable (Pneumonia)  Outcome: Ongoing (interventions implemented as appropriate)    07/21/17 0818   Pneumonia   Problems Assessed (Pneumonia) all   Problems Present (Pneumonia) none

## 2017-07-22 VITALS
OXYGEN SATURATION: 93 % | WEIGHT: 152 LBS | RESPIRATION RATE: 18 BRPM | SYSTOLIC BLOOD PRESSURE: 147 MMHG | TEMPERATURE: 97.8 F | HEIGHT: 69 IN | BODY MASS INDEX: 22.51 KG/M2 | DIASTOLIC BLOOD PRESSURE: 73 MMHG | HEART RATE: 90 BPM

## 2017-07-22 LAB
ANION GAP SERPL CALCULATED.3IONS-SCNC: 9 MMOL/L (ref 3–11)
BACTERIA SPEC RESP CULT: NORMAL
BUN BLD-MCNC: 5 MG/DL (ref 9–23)
BUN/CREAT SERPL: 10 (ref 7–25)
CALCIUM SPEC-SCNC: 8.4 MG/DL (ref 8.7–10.4)
CHLORIDE SERPL-SCNC: 98 MMOL/L (ref 99–109)
CO2 SERPL-SCNC: 25 MMOL/L (ref 20–31)
CREAT BLD-MCNC: 0.5 MG/DL (ref 0.6–1.3)
DEPRECATED RDW RBC AUTO: 46.1 FL (ref 37–54)
ERYTHROCYTE [DISTWIDTH] IN BLOOD BY AUTOMATED COUNT: 14.2 % (ref 11.3–14.5)
GFR SERPL CREATININE-BSD FRML MDRD: >150 ML/MIN/1.73
GLUCOSE BLD-MCNC: 98 MG/DL (ref 70–100)
GRAM STN SPEC: NORMAL
HCT VFR BLD AUTO: 34.5 % (ref 38.9–50.9)
HGB BLD-MCNC: 11.5 G/DL (ref 13.1–17.5)
MCH RBC QN AUTO: 29.5 PG (ref 27–31)
MCHC RBC AUTO-ENTMCNC: 33.3 G/DL (ref 32–36)
MCV RBC AUTO: 88.5 FL (ref 80–99)
PLATELET # BLD AUTO: 420 10*3/MM3 (ref 150–450)
PMV BLD AUTO: 9.3 FL (ref 6–12)
POTASSIUM BLD-SCNC: 3.2 MMOL/L (ref 3.5–5.5)
RBC # BLD AUTO: 3.9 10*6/MM3 (ref 4.2–5.76)
SODIUM BLD-SCNC: 132 MMOL/L (ref 132–146)
WBC NRBC COR # BLD: 8.92 10*3/MM3 (ref 3.5–10.8)

## 2017-07-22 PROCEDURE — 99239 HOSP IP/OBS DSCHRG MGMT >30: CPT | Performed by: INTERNAL MEDICINE

## 2017-07-22 PROCEDURE — 80048 BASIC METABOLIC PNL TOTAL CA: CPT | Performed by: INTERNAL MEDICINE

## 2017-07-22 PROCEDURE — 85027 COMPLETE CBC AUTOMATED: CPT | Performed by: INTERNAL MEDICINE

## 2017-07-22 RX ORDER — AZITHROMYCIN 250 MG/1
TABLET, FILM COATED ORAL
Qty: 4 TABLET | Refills: 0 | Status: SHIPPED | OUTPATIENT
Start: 2017-07-23

## 2017-07-22 RX ORDER — POTASSIUM CHLORIDE 1.5 G/1.77G
40 POWDER, FOR SOLUTION ORAL AS NEEDED
Status: DISCONTINUED | OUTPATIENT
Start: 2017-07-22 | End: 2017-07-22 | Stop reason: HOSPADM

## 2017-07-22 RX ORDER — POTASSIUM CHLORIDE 7.45 MG/ML
10 INJECTION INTRAVENOUS
Status: DISCONTINUED | OUTPATIENT
Start: 2017-07-22 | End: 2017-07-22 | Stop reason: HOSPADM

## 2017-07-22 RX ORDER — POTASSIUM CHLORIDE 750 MG/1
40 CAPSULE, EXTENDED RELEASE ORAL AS NEEDED
Status: DISCONTINUED | OUTPATIENT
Start: 2017-07-22 | End: 2017-07-22 | Stop reason: HOSPADM

## 2017-07-22 RX ADMIN — POTASSIUM CHLORIDE 40 MEQ: 750 CAPSULE, EXTENDED RELEASE ORAL at 06:32

## 2017-07-22 RX ADMIN — Medication 250 MG: at 08:31

## 2017-07-22 RX ADMIN — POTASSIUM CHLORIDE 40 MEQ: 750 CAPSULE, EXTENDED RELEASE ORAL at 10:45

## 2017-07-22 NOTE — PLAN OF CARE
Problem: Pneumonia (Adult)  Goal: Signs and Symptoms of Listed Potential Problems Will be Absent or Manageable (Pneumonia)  Outcome: Ongoing (interventions implemented as appropriate)    07/22/17 0353 07/22/17 0846   Pneumonia   Problems Assessed (Pneumonia) all --    Problems Present (Pneumonia) --  fluid/electrolyte imbalance

## 2017-07-22 NOTE — DISCHARGE SUMMARY
[facility] Medicine Services  DISCHARGE SUMMARY       Date of Admission: 7/19/2017  Date of Discharge:  7/22/2017  Primary Care Physician: No Known Provider  Consulting Physician(s)          None           Discharge Diagnoses:  Active Hospital Problems (** Indicates Principal Problem)    Diagnosis Date Noted   • **CAP (community acquired pneumonia) of LLL [J18.9] 07/19/2017   • Leukocytosis [D72.829] 07/19/2017   • Hypokalemia [E87.6] 07/19/2017   • Sepsis [A41.9] 07/19/2017   • Hyponatremia [E87.1] 07/19/2017      Resolved Hospital Problems    Diagnosis Date Noted Date Resolved   No resolved problems to display.       Presenting Problem/History of Present Illness  Sepsis, due to unspecified organism [A41.9]  Sepsis, due to unspecified organism [A41.9]     Chief Complaint: cough/ disoriented    Day of Discharge: Doing well this am, anxious to return home today.  Cough has improved, no fever or chills.     Hospital Course  Patient is a 71 y.o. male presented with complaints of productive cough for two weeks- initially of white sputum then green sputum.  Patient's wife also noticed that he was disoriented at times getting lost around the house.  He was seen by his PCP initially who then sent him to the ED.  Pt was found to have a LLL CAP on admission- he was also met sepsis criteria with fever, leukocytosis, and transient hypotension.  He was started on IVFs and his hypotension improved. Pt was also hyponatremic on admission, this resolved by time of discharge.  Pt was given IV Rocephin and Azithromycin while inpatient.  His urine Strep and Legionella Ags are currently PENDING.  His blood cx have remained NGTD.        Of note, pt was noted to have a pulmonary nodule on CT chest- this needs repeat imaging in the near future (3-6 months) with PCP to ensure resolution.      Procedures Performed  CXR 7/19-  1. Left lower lobe pneumonia.  2. Mild diffuse interstitial changes elsewhere which could  be chronic or  could reflect an underlying bronchitis.   3. Round dense left upper lobe nodule, probably partially calcified and  favored to represent a granuloma. If there are no previous outside  studies available for comparison, consider chest CT scan evaluation to  confirm calcification.     CT Chest 7/21/17-  1. Approximately 2 cm nodule in the superior segment of the left lower  lobe, with dense central calcification. Most of the nodule is  noncalcified. This is strongly favored to be benign, but should be  followed for stability over time.  2. Left lower lobe pneumonia with extensive airspace disease and densely  consolidated posterior left lower lobe.  3. Very small left and minimal right pleural effusions.  4. Moderate subcarinal lymphadenopathy, likely reactive, and shotty  nodes elsewhere. Consider follow-up scanning to evaluate the larger  subcarinal nodes for resolution, after treatment.    Consults:   Consults     No orders found from 6/20/2017 to 7/20/2017.          Pertinent Test Results:  Respiratory culture:  Culture   Scant growth (1+) Normal Respiratory Nahomy      Stain   Occasional WBCs per low power field      Occasional Epithelial cells per low power field      No organisms seen       Blood Cx NGTD    Urine Strep and Legionella Ags PENDING      Results from last 7 days  Lab Units 07/22/17  0452 07/21/17  0658 07/20/17  0623   WBC 10*3/mm3 8.92 9.90 10.23   HEMOGLOBIN g/dL 11.5* 12.0* 11.9*   HEMATOCRIT % 34.5* 35.7* 35.7*   PLATELETS 10*3/mm3 420 330 298       Results from last 7 days  Lab Units 07/22/17  0452 07/21/17  0658 07/20/17  0623   SODIUM mmol/L 132 124* 125*   POTASSIUM mmol/L 3.2* 3.6 3.9   CHLORIDE mmol/L 98* 92* 90*   CO2 mmol/L 25.0 24.0 25.0   BUN mg/dL 5* 5* 10   CREATININE mg/dL 0.50* 0.50* 0.60   GLUCOSE mg/dL 98 92 100   CALCIUM mg/dL 8.4* 8.4* 8.9         Condition on Discharge:  stable    Physical Exam on Discharge:/73 (BP Location: Right arm, Patient Position:  "Lying)  Pulse 90  Temp 97.8 °F (36.6 °C) (Oral)   Resp 18  Ht 69\" (175.3 cm)  Wt 152 lb (68.9 kg)  SpO2 93%  BMI 22.45 kg/m2  Physical Exam    General- AOx3, NAD  HEENT- PERRLA  CVS- RRR, no M/R/G  Pulm- CTAB  Abd- soft, NT, ND, (+) BS  Extr- no c/c/e  Neuro- no focal deficits  Psych- normal affect    Discharge Disposition  Home or Self Care    Discharge Medications   Glen Gonzalez Jr.   Home Medication Instructions ARMEN:[id number]    Printed on:07/22/17 0951   Medication Information                      azithromycin (ZITHROMAX) 250 MG tablet  Take one tablet daily for four days.                 Discharge Diet:     Discharge Care Plan / Instructions:    Activity at Discharge:   Activity Instructions     Activity as Tolerated                     Follow-up Appointments  No future appointments.  Additional Instructions for the Follow-ups that You Need to Schedule     Discharge Follow-up with PCP    As directed    Follow Up Details:  in one week                 Test Results Pending at Discharge   Order Current Status    Legionella Antigen, Urine In process    S. Pneumo Ag Urine or CSF In process    Blood Culture Preliminary result    Blood Culture Preliminary result           Kesha Mackay MD 07/22/17 11:04 AM    Time: 35 minutes spent in discharge coordination today        "

## 2017-07-24 LAB
BACTERIA FLD CULT: NORMAL
BACTERIA SPEC AEROBE CULT: NORMAL
BACTERIA SPEC AEROBE CULT: NORMAL
L PNEUMO1 AG UR QL IA: POSITIVE
Lab: NORMAL
ORGANISM ID: NORMAL
S PNEUM AG SPEC QL LA: NEGATIVE
SPECIMEN SOURCE: NORMAL

## 2017-10-31 ENCOUNTER — TRANSCRIBE ORDERS (OUTPATIENT)
Dept: ADMINISTRATIVE | Facility: HOSPITAL | Age: 72
End: 2017-10-31

## 2017-10-31 DIAGNOSIS — R93.89 ABNORMAL CT OF THE CHEST: Primary | ICD-10-CM

## 2017-11-09 ENCOUNTER — HOSPITAL ENCOUNTER (OUTPATIENT)
Dept: CT IMAGING | Facility: HOSPITAL | Age: 72
Discharge: HOME | End: 2017-11-09
Attending: FAMILY MEDICINE | Admitting: FAMILY MEDICINE

## 2017-11-09 DIAGNOSIS — R93.89 ABNORMAL CT OF THE CHEST: ICD-10-CM

## 2017-11-09 PROCEDURE — 71250 CT THORAX DX C-: CPT
